# Patient Record
Sex: MALE | Race: WHITE | NOT HISPANIC OR LATINO | Employment: FULL TIME | URBAN - METROPOLITAN AREA
[De-identification: names, ages, dates, MRNs, and addresses within clinical notes are randomized per-mention and may not be internally consistent; named-entity substitution may affect disease eponyms.]

---

## 2023-10-03 ENCOUNTER — CONSULT (OUTPATIENT)
Dept: CARDIOLOGY | Facility: CLINIC | Age: 28
End: 2023-10-03
Payer: COMMERCIAL

## 2023-10-03 VITALS
HEIGHT: 72 IN | BODY MASS INDEX: 23.57 KG/M2 | SYSTOLIC BLOOD PRESSURE: 116 MMHG | OXYGEN SATURATION: 99 % | HEART RATE: 73 BPM | DIASTOLIC BLOOD PRESSURE: 70 MMHG | WEIGHT: 174 LBS

## 2023-10-03 DIAGNOSIS — R94.31 ABNORMAL EKG: Primary | ICD-10-CM

## 2023-10-03 PROCEDURE — 93000 ELECTROCARDIOGRAM COMPLETE: CPT | Performed by: INTERNAL MEDICINE

## 2023-10-03 PROCEDURE — 99202 OFFICE O/P NEW SF 15 MIN: CPT | Performed by: INTERNAL MEDICINE

## 2023-10-03 RX ORDER — ALBUTEROL SULFATE 90 UG/1
AEROSOL, METERED RESPIRATORY (INHALATION)
COMMUNITY
Start: 2023-06-22

## 2023-10-03 NOTE — PROGRESS NOTES
"Cardiology Office Visit      Encounter Date:  10/03/2023    Patient ID:   Jarod Peraza is a 27 y.o. male.    Reason For Followup:  Abnormal EKG    Brief Clinical History:  Dear Simona Foreman APRN    I had the pleasure of seeing Jarod Peraza today. As you are well aware, this is a 27 y.o. male no prior cardiac history no prior congenital heart disease good functional status was recently seen and evaluated for possible attention deficit disorder was scheduled to be started on some medication as a screening for starting the medication patient obtained an EKG that was abnormal that prompted the cardiology evaluation          Interval History:  Patient denies any exertional symptoms of chest pain shortness of breath dizziness or syncope  No orthopnea no PND  Good functional status with no exercise limitation  No prior cardiac diagnosis or work-up  No congenital heart disease      Assessment & Plan    Impressions:  Abnormal EKG with early repolarization abnormality which is normal variant with no concern for any cardiac pathology  Attention deficit disorder    Recommendations:  From cardiac standpoint patient does not need any further cardiac evaluation work-up  Okay to start patient on ADHD medication if needed  Prior work-up prior EKG and prior medical records that are available reviewed and discussed with the patient  Follow-up in office as needed            No results found for: GLUCOSE, BUN, CREATININE, EGFRRESULT, EGFR, BCR, K, CO2, CALCIUM, PROTENTOTREF, ALBUMIN, BILITOT, AST, ALT     No results found for this or any previous visit.     No results found for: CHOL, CHLPL, TRIG, HDL, LDL, LDLDIRECT             Objective:    Vitals:  Vitals:    10/03/23 1427   BP: 116/70   BP Location: Left arm   Patient Position: Sitting   Pulse: 73   SpO2: 99%   Weight: 78.9 kg (174 lb)   Height: 182.9 cm (72\")       Physical Exam:    General: Alert, cooperative, no distress, appears stated age  Head:  Normocephalic, " atraumatic, mucous membranes moist  Eyes:  Conjunctiva/corneas clear, EOM's intact     Neck:  Supple,  no adenopathy;      Lungs: Clear to auscultation bilaterally, no wheezes rhonchi rales are noted  Chest wall: No tenderness  Heart::  Regular rate and rhythm, S1 and S2 normal, no murmur, rub or gallop  Abdomen: Soft, non-tender, nondistended bowel sounds active  Extremities: No cyanosis, clubbing, or edema  Pulses: 2+ and symmetric all extremities  Skin:  No rashes or lesions  Neuro/psych: A&O x3. CN II through XII are grossly intact with appropriate affect      Allergies:  Allergies   Allergen Reactions    Penicillins Rash       Medication Review:     Current Outpatient Medications:     albuterol sulfate  (90 Base) MCG/ACT inhaler, INHALE 2 PUFFS BY MOUTH INTO THE LUNGS EVERY 6 HOURS AS NEEDED FOR WHEEZING, Disp: , Rfl:     Family History:  Family History   Problem Relation Age of Onset    No Known Problems Mother     No Known Problems Father        Past Medical History:  Past Medical History:   Diagnosis Date    Asthma        Past surgical History:  History reviewed. No pertinent surgical history.    Social History:  Social History     Socioeconomic History    Marital status: Single   Tobacco Use    Smoking status: Never    Smokeless tobacco: Never   Vaping Use    Vaping Use: Former   Substance and Sexual Activity    Alcohol use: Yes     Alcohol/week: 2.0 standard drinks     Types: 2 Cans of beer per week    Drug use: Never    Sexual activity: Defer       Review of Systems:  The following systems were reviewed as they relate to the cardiovascular system: Constitutional, Eyes, ENT, Cardiovascular, Respiratory, Gastrointestinal, Integumentary, Neurological, Psychiatric, Hematologic, Endocrine, Musculoskeletal, and Genitourinary. The pertinent cardiovascular findings are reported above with all other cardiovascular points within those systems being negative.    Diagnostic Study Review:     Current  Electrocardiogram:    ECG 12 Lead    Date/Time: 10/3/2023 3:01 PM  Performed by: Rosa Fuentes MD  Authorized by: Rosa Fuentes MD   Comparison: compared with previous ECG   Similar to previous ECG  Rhythm: sinus rhythm  Rate: normal  BPM: 58  Conduction: conduction normal  QRS axis: normal  Other findings: early repolarization    Clinical impression: normal ECG                  NOTE: The following portions of the patient's history were reviewed and updated this visit as appropriate: allergies, current medications, past family history, past medical history, past social history, past surgical history and problem list.

## 2023-10-09 ENCOUNTER — TELEPHONE (OUTPATIENT)
Dept: CARDIOLOGY | Facility: CLINIC | Age: 28
End: 2023-10-09
Payer: COMMERCIAL

## 2023-10-09 NOTE — TELEPHONE ENCOUNTER
Caller: Jarod Peraza    Relationship: Self    Best call back number: [phone]    What form or medical record are you requesting: NEEDS CLEARANCE REGARDING EKG RESULTS    Who is requesting this form or medical record from you: ROSY VAUGHN    How would you like to receive the form or medical records (pick-up, mail, fax): NHOT PROVIDED    Timeframe paperwork needed:     Additional notes: PATIENT WAS TOLD THAT CLEARANCE WOULD BE NEEDED. PROVIDER WILL FAX FORM

## 2023-10-10 NOTE — TELEPHONE ENCOUNTER
RECEIVED & IN[facility]ED [facility]10[facility].23 THE REQUEST FORM FOR THIS CARDIAC CLEARANCE.